# Patient Record
Sex: MALE | Race: WHITE | NOT HISPANIC OR LATINO | Employment: OTHER | URBAN - METROPOLITAN AREA
[De-identification: names, ages, dates, MRNs, and addresses within clinical notes are randomized per-mention and may not be internally consistent; named-entity substitution may affect disease eponyms.]

---

## 2019-06-11 ENCOUNTER — TRANSCRIBE ORDERS (OUTPATIENT)
Dept: SLEEP MEDICINE | Facility: HOSPITAL | Age: 64
End: 2019-06-11

## 2019-06-11 DIAGNOSIS — R09.02 HYPOXEMIA: Primary | ICD-10-CM

## 2019-06-18 ENCOUNTER — HOSPITAL ENCOUNTER (OUTPATIENT)
Dept: SLEEP MEDICINE | Facility: HOSPITAL | Age: 64
Discharge: HOME | End: 2019-06-18
Admitting: INTERNAL MEDICINE

## 2019-06-18 DIAGNOSIS — R09.02 HYPOXEMIA: ICD-10-CM

## 2019-06-18 PROCEDURE — 95810 POLYSOM 6/> YRS 4/> PARAM: CPT

## 2019-06-18 PROCEDURE — 95810 POLYSOM 6/> YRS 4/> PARAM: CPT | Performed by: INTERNAL MEDICINE

## 2019-10-11 ENCOUNTER — TRANSCRIBE ORDERS (OUTPATIENT)
Dept: ADMINISTRATIVE | Facility: HOSPITAL | Age: 64
End: 2019-10-11

## 2019-10-11 DIAGNOSIS — R42 DIZZINESS: Primary | ICD-10-CM

## 2019-10-17 ENCOUNTER — HOSPITAL ENCOUNTER (OUTPATIENT)
Dept: ULTRASOUND IMAGING | Facility: HOSPITAL | Age: 64
Discharge: HOME | End: 2019-10-17
Admitting: FAMILY MEDICINE

## 2019-10-17 DIAGNOSIS — R42 DIZZINESS: ICD-10-CM

## 2019-10-17 PROCEDURE — 76775 US EXAM ABDO BACK WALL LIM: CPT

## 2020-11-04 RX ORDER — SODIUM, POTASSIUM,MAG SULFATES 17.5-3.13G
2 SOLUTION, RECONSTITUTED, ORAL ORAL TAKE AS DIRECTED
Qty: 354 ML | Refills: 0 | Status: SHIPPED | OUTPATIENT
Start: 2020-11-04

## 2021-04-29 RX ORDER — SODIUM, POTASSIUM,MAG SULFATES 17.5-3.13G
2 SOLUTION, RECONSTITUTED, ORAL ORAL TAKE AS DIRECTED
Qty: 354 ML | Refills: 0 | Status: SHIPPED | OUTPATIENT
Start: 2021-04-29

## 2021-05-05 ENCOUNTER — TELEPHONE (OUTPATIENT)
Dept: GASTROENTEROLOGY | Facility: CLINIC | Age: 66
End: 2021-05-05

## 2021-05-05 NOTE — TELEPHONE ENCOUNTER
MR. RAMIREZ CALLED RE:EGD VERY NERVOUS ABOUT HIS EGD AND MEDS ETC. WENT OVER EVERYTHING WITH HIM, THEN HE CALLED BACK THINKING HE MIGHT NEED TO CANCELL EGD AND SCHEDULE LATER. TOLD HIM I WOULD HAVE DINAH CALL.

## 2021-05-06 ENCOUNTER — OUTSIDE FACILITY SERVICE (OUTPATIENT)
Dept: GASTROENTEROLOGY | Facility: CLINIC | Age: 66
End: 2021-05-06

## 2021-05-06 PROCEDURE — G0105 COLORECTAL SCRN; HI RISK IND: HCPCS | Performed by: INTERNAL MEDICINE

## 2021-05-06 PROCEDURE — 88305 TISSUE EXAM BY PATHOLOGIST: CPT | Performed by: INTERNAL MEDICINE

## 2021-05-06 PROCEDURE — 43249 ESOPH EGD DILATION <30 MM: CPT | Performed by: INTERNAL MEDICINE

## 2021-05-06 PROCEDURE — 43239 EGD BIOPSY SINGLE/MULTIPLE: CPT | Performed by: INTERNAL MEDICINE

## 2021-05-07 ENCOUNTER — LAB REQUISITION (OUTPATIENT)
Dept: LAB | Facility: HOSPITAL | Age: 66
End: 2021-05-07

## 2021-05-07 DIAGNOSIS — R13.10 DYSPHAGIA, UNSPECIFIED: ICD-10-CM

## 2021-05-10 LAB
CYTO UR: NORMAL
LAB AP CASE REPORT: NORMAL
LAB AP CLINICAL INFORMATION: NORMAL
PATH REPORT.FINAL DX SPEC: NORMAL
PATH REPORT.GROSS SPEC: NORMAL

## 2023-01-09 ENCOUNTER — TRANSCRIBE ORDERS (OUTPATIENT)
Dept: ADMINISTRATIVE | Facility: HOSPITAL | Age: 68
End: 2023-01-09
Payer: MEDICARE

## 2023-01-09 DIAGNOSIS — Z13.6 ENCOUNTER FOR SCREENING FOR VASCULAR DISEASE: Primary | ICD-10-CM

## 2023-01-16 ENCOUNTER — HOSPITAL ENCOUNTER (OUTPATIENT)
Dept: CARDIOLOGY | Facility: HOSPITAL | Age: 68
Discharge: HOME | End: 2023-01-16

## 2023-01-16 ENCOUNTER — HOSPITAL ENCOUNTER (OUTPATIENT)
Dept: CT IMAGING | Facility: HOSPITAL | Age: 68
Discharge: HOME | End: 2023-01-16

## 2023-01-16 DIAGNOSIS — Z13.6 ENCOUNTER FOR SCREENING FOR VASCULAR DISEASE: ICD-10-CM

## 2023-01-16 LAB
BH CV XLRA MEAS - MID AO DIAM: 1.95 CM
BH CV XLRA MEAS - PAD LEFT ABI PT: 1.32
BH CV XLRA MEAS - PAD LEFT ARM: 105 MMHG
BH CV XLRA MEAS - PAD LEFT LEG PT: 139 MMHG
BH CV XLRA MEAS - PAD RIGHT ABI PT: 1.33
BH CV XLRA MEAS - PAD RIGHT ARM: 102 MMHG
BH CV XLRA MEAS - PAD RIGHT LEG PT: 140 MMHG
BH CV XLRA MEAS LEFT DIST CCA EDV: -28 CM/SEC
BH CV XLRA MEAS LEFT DIST CCA PSV: -93.2 CM/SEC
BH CV XLRA MEAS LEFT ICA/CCA RATIO: 1
BH CV XLRA MEAS LEFT MID CCA PSV: 93 CM/SEC
BH CV XLRA MEAS LEFT MID ICA PSV: 89 CM/SEC
BH CV XLRA MEAS LEFT PROX ICA EDV: -31.4 CM/SEC
BH CV XLRA MEAS LEFT PROX ICA PSV: -89.4 CM/SEC
BH CV XLRA MEAS RIGHT DIST CCA EDV: 29.8 CM/SEC
BH CV XLRA MEAS RIGHT DIST CCA PSV: 83.9 CM/SEC
BH CV XLRA MEAS RIGHT ICA/CCA RATIO: 1.1
BH CV XLRA MEAS RIGHT MID CCA PSV: 84 CM/SEC
BH CV XLRA MEAS RIGHT MID ICA PSV: 94 CM/SEC
BH CV XLRA MEAS RIGHT PROX ICA EDV: -29.2 CM/SEC
BH CV XLRA MEAS RIGHT PROX ICA PSV: -94.4 CM/SEC
MAXIMAL PREDICTED HEART RATE: 155 BPM
STRESS TARGET HR: 132 BPM

## 2023-01-16 PROCEDURE — 93799 UNLISTED CV SVC/PROCEDURE: CPT

## 2023-01-16 PROCEDURE — 75571 CT HRT W/O DYE W/CA TEST: CPT

## 2024-05-09 ENCOUNTER — TRANSCRIBE ORDERS (OUTPATIENT)
Dept: ADMINISTRATIVE | Facility: HOSPITAL | Age: 69
End: 2024-05-09
Payer: MEDICARE

## 2024-05-09 DIAGNOSIS — N40.1 BENIGN LOCALIZED PROSTATIC HYPERPLASIA WITH LOWER URINARY TRACT SYMPTOMS (LUTS): Primary | ICD-10-CM

## 2024-06-14 ENCOUNTER — HOSPITAL ENCOUNTER (OUTPATIENT)
Facility: HOSPITAL | Age: 69
Discharge: HOME | End: 2024-06-14
Payer: MEDICARE

## 2024-06-14 ENCOUNTER — TELEPHONE (OUTPATIENT)
Dept: MRI IMAGING | Facility: HOSPITAL | Age: 69
End: 2024-06-14
Payer: MEDICARE

## 2024-06-14 DIAGNOSIS — T81.509A FB (FOREIGN OBJECT LEFT IN BODY DURING PROCEDURE), INITIAL ENCOUNTER: ICD-10-CM

## 2024-06-14 DIAGNOSIS — N40.1 BENIGN LOCALIZED PROSTATIC HYPERPLASIA WITH LOWER URINARY TRACT SYMPTOMS (LUTS): ICD-10-CM

## 2024-06-14 NOTE — TELEPHONE ENCOUNTER
Patient was uneasy about getting MRI contrast, called Radiologist Dr. Haider said it was okay to do without but will be a limited study. Called ordering provider for approval to do exam without contrast. Per Nicole from MD office ordering provider said he needs exam with contrast. Patient will talk to MD about his options and will reschedule if needed

## 2024-10-02 ENCOUNTER — OFFICE VISIT (OUTPATIENT)
Dept: SURGERY | Facility: CLINIC | Age: 69
End: 2024-10-02
Payer: COMMERCIAL

## 2024-10-02 VITALS
BODY MASS INDEX: 27.09 KG/M2 | SYSTOLIC BLOOD PRESSURE: 122 MMHG | HEIGHT: 72 IN | HEART RATE: 60 BPM | RESPIRATION RATE: 16 BRPM | WEIGHT: 200 LBS | DIASTOLIC BLOOD PRESSURE: 80 MMHG

## 2024-10-02 DIAGNOSIS — K40.90 RIGHT INGUINAL HERNIA: Primary | ICD-10-CM

## 2024-10-02 PROCEDURE — 99204 OFFICE O/P NEW MOD 45 MIN: CPT | Performed by: SURGERY

## 2024-10-02 NOTE — LETTER
October 2, 2024       No Recipients    Patient: Jose Angel Gaytan   YOB: 1955   Date of Visit: 10/2/2024     Dear José Miguel Malloy MD:       Thank you for referring Jose Angel Gaytan to me for evaluation. Below are the relevant portions of my assessment and plan of care.    If you have questions, please do not hesitate to call me. I look forward to following Jose Angel along with you.         Sincerely,        Lisandra Payne DO        CC:   No Recipients    Lisandra Payne DO  10/02/24 1421  Sign when Signing Visit  Jose Angel Gaytan 69 y.o. male presents @ the req of [facility] for eval of [facility].  Pt reports + bulge and pain.   Chief Complaint   Patient presents with   • Hernia             HPI   Above noted agree.  Jose Angel is here with a right inguinal hernia.  [place].  Last week he noticed a bulge in his right inguinal area after his dog jumped on him.  It was very similar to a left inguinal hernia he had.  Initially the area did not hurt.  It is now becoming more tender.  He tolerates a regular diet without nausea or vomiting.  He also has intermittent atrial fibrillation.  He does not take blood thinners.  He denies any chest pain or shortness of breath.      Review of Systems   All other systems reviewed and are negative.            Current Outpatient Medications:   •  ALPRAZolam (XANAX) 0.5 MG tablet, Take 0.5 mg by mouth 3 (Three) Times a Day., Disp: , Rfl:   •  aspirin 81 MG EC tablet, Take 81 mg by mouth Daily., Disp: , Rfl:   •  atenolol (TENORMIN) 25 MG tablet, Take 1 tablet by mouth Daily., Disp: , Rfl:   •  finasteride (PROSCAR) 5 MG tablet, Take 1 tablet by mouth Daily., Disp: , Rfl:   •  lansoprazole (PREVACID) 30 MG capsule, Take 1 capsule by mouth Daily., Disp: , Rfl:   •  olmesartan (BENICAR) 40 MG tablet, Take 1 tablet by mouth Daily., Disp: , Rfl:         No Known Allergies        Past Medical History:   Diagnosis  "Date   • Atrial fibrillation    • GERD (gastroesophageal reflux disease)    • Hyperlipidemia    • Hypertension            Past Surgical History:   Procedure Laterality Date   • HERNIA REPAIR             Social History     Tobacco Use   • Smoking status: Never   • Smokeless tobacco: Never   Vaping Use   • Vaping status: Never Used   Substance Use Topics   • Alcohol use: Not Currently     Comment: seldom   • Drug use: Never     Types: Marijuana           Immunization History   Administered Date(s) Administered   • COVID-19 (PFIZER) Purple Cap Monovalent 02/19/2021, 03/11/2021           Physical Exam  Vitals and nursing note reviewed.   Constitutional:       Appearance: Normal appearance.   Cardiovascular:      Rate and Rhythm: Normal rate and regular rhythm.   Pulmonary:      Effort: Pulmonary effort is normal.      Breath sounds: Normal breath sounds.   Abdominal:      General: Bowel sounds are normal.      Palpations: Abdomen is soft.      Comments: Right inguinal hernia noted on Valsalva   Skin:     General: Skin is warm and dry.   Neurological:      General: No focal deficit present.      Mental Status: He is alert and oriented to person, place, and time.   Psychiatric:         Mood and Affect: Mood normal.         Behavior: Behavior normal.         Debilities/Disabilities Identified: None    Emotional Behavior: Appropriate      /80   Pulse 60   Resp 16   Ht 182.9 cm (72\")   Wt 90.7 kg (200 lb)   BMI 27.12 kg/m²         Diagnoses and all orders for this visit:    1. Right inguinal hernia (Primary)    I discussed with Jose Angel the benefits and risks of a laparoscopic right inguinal hernia repair.  Benefits and risks not limited to including: Bleeding, infection, hernia recurrence, hernia at a port site, injury to intra-abdominal structures, anesthesia complications.  He appeared to understand and all of his questions were answered and he is willing to proceed.    Thank you for allowing me to participate in " the care of this interesting patient.

## 2024-10-02 NOTE — PROGRESS NOTES
Jose Angel Gaytan 69 y.o. male presents @ the req of [facility] for eval of [facility].  Pt reports + bulge and pain.   Chief Complaint   Patient presents with    Hernia             HPI   Above noted agree.  Jose Angel is here with a right inguinal hernia.  [place] and [place].  Last week he noticed a bulge in his right inguinal area after his dog jumped on him.  It was very similar to a left inguinal hernia he had.  Initially the area did not hurt.  It is now becoming more tender.  He tolerates a regular diet without nausea or vomiting.  He also has intermittent atrial fibrillation.  He does not take blood thinners.  He denies any chest pain or shortness of breath.      Review of Systems   All other systems reviewed and are negative.            Current Outpatient Medications:     ALPRAZolam (XANAX) 0.5 MG tablet, Take 0.5 mg by mouth 3 (Three) Times a Day., Disp: , Rfl:     aspirin 81 MG EC tablet, Take 81 mg by mouth Daily., Disp: , Rfl:     atenolol (TENORMIN) 25 MG tablet, Take 1 tablet by mouth Daily., Disp: , Rfl:     finasteride (PROSCAR) 5 MG tablet, Take 1 tablet by mouth Daily., Disp: , Rfl:     lansoprazole (PREVACID) 30 MG capsule, Take 1 capsule by mouth Daily., Disp: , Rfl:     olmesartan (BENICAR) 40 MG tablet, Take 1 tablet by mouth Daily., Disp: , Rfl:         No Known Allergies        Past Medical History:   Diagnosis Date    Atrial fibrillation     GERD (gastroesophageal reflux disease)     Hyperlipidemia     Hypertension            Past Surgical History:   Procedure Laterality Date    HERNIA REPAIR             Social History     Tobacco Use    Smoking status: Never    Smokeless tobacco: Never   Vaping Use    Vaping status: Never Used   Substance Use Topics    Alcohol use: Not Currently     Comment: seldom    Drug use: Never     Types: Marijuana           Immunization History   Administered Date(s) Administered    COVID-19 (PFIZER) Purple Cap Monovalent  "02/19/2021, 03/11/2021           Physical Exam  Vitals and nursing note reviewed.   Constitutional:       Appearance: Normal appearance.   Cardiovascular:      Rate and Rhythm: Normal rate and regular rhythm.   Pulmonary:      Effort: Pulmonary effort is normal.      Breath sounds: Normal breath sounds.   Abdominal:      General: Bowel sounds are normal.      Palpations: Abdomen is soft.      Comments: Right inguinal hernia noted on Valsalva   Skin:     General: Skin is warm and dry.   Neurological:      General: No focal deficit present.      Mental Status: He is alert and oriented to person, place, and time.   Psychiatric:         Mood and Affect: Mood normal.         Behavior: Behavior normal.         Debilities/Disabilities Identified: None    Emotional Behavior: Appropriate      /80   Pulse 60   Resp 16   Ht 182.9 cm (72\")   Wt 90.7 kg (200 lb)   BMI 27.12 kg/m²         Diagnoses and all orders for this visit:    1. Right inguinal hernia (Primary)    I discussed with Jose Angel the benefits and risks of a laparoscopic right inguinal hernia repair.  Benefits and risks not limited to including: Bleeding, infection, hernia recurrence, hernia at a port site, injury to intra-abdominal structures, anesthesia complications.  He appeared to understand and all of his questions were answered and he is willing to proceed.    Thank you for allowing me to participate in the care of this interesting patient.         "

## 2024-10-04 ENCOUNTER — PATIENT ROUNDING (BHMG ONLY) (OUTPATIENT)
Dept: SURGERY | Facility: CLINIC | Age: 69
End: 2024-10-04
Payer: MEDICARE

## 2024-10-04 NOTE — PROGRESS NOTES
October 4, 2024        I am calling to officially welcome you to our practice and ask about your recent visit. Is this a good time to talk? No. Left voicemail to call back.,

## 2024-10-08 ENCOUNTER — TELEPHONE (OUTPATIENT)
Dept: SURGERY | Facility: CLINIC | Age: 69
End: 2024-10-08

## 2024-10-08 NOTE — TELEPHONE ENCOUNTER
Hub staff attempted to follow warm transfer process and was unsuccessful     Caller: Jose Angel Gaytan    Relationship to patient: Self    Best call back number: [phone] (home)       Patient is needing: PATIENT WANTS TO CANCEL PROCEDURE DUE TO THE DISTANCE/LOCATION OF WHERE PROCEDURE WOULD BE DONE. HE WILL BE LOOKING FOR A DOCTOR THAT IS CLOSER FOR HIS CARE.

## 2024-10-09 ENCOUNTER — TELEPHONE (OUTPATIENT)
Dept: SURGERY | Facility: CLINIC | Age: 69
End: 2024-10-09
Payer: MEDICARE

## 2024-10-09 NOTE — TELEPHONE ENCOUNTER
Rec'd the following msg from HUB:          Hub staff attempted to follow warm transfer process and was unsuccessful      Caller: Jose Angel Gaytan     Relationship to patient: Self     Best call back number: [phone] (home)         Patient is needing: PATIENT WANTS TO CANCEL PROCEDURE DUE TO THE DISTANCE/LOCATION OF WHERE PROCEDURE WOULD BE DONE. HE WILL BE LOOKING FOR A DOCTOR THAT IS CLOSER FOR HIS CARE.        SG SCHED notified.